# Patient Record
Sex: FEMALE | Race: WHITE | NOT HISPANIC OR LATINO | URBAN - NONMETROPOLITAN AREA
[De-identification: names, ages, dates, MRNs, and addresses within clinical notes are randomized per-mention and may not be internally consistent; named-entity substitution may affect disease eponyms.]

---

## 2020-03-04 ENCOUNTER — IMPORTED ENCOUNTER (OUTPATIENT)
Dept: URBAN - NONMETROPOLITAN AREA CLINIC 1 | Facility: CLINIC | Age: 35
End: 2020-03-04

## 2020-03-04 PROBLEM — H01.144: Noted: 2020-03-04

## 2020-03-04 PROCEDURE — 99212 OFFICE O/P EST SF 10 MIN: CPT

## 2020-03-04 NOTE — PATIENT DISCUSSION
eyelid lesion u/l os with possible cellulitisstart keflex 500 mg tid po x 1wkstart maxitrol cristi tid on lid x 1wkrtc prnHyperopia-Discussed diagnosis with patient. Updated spec Rx given. Recommend lens that will provide comfort as well as protect safety and health of eyes. CL wear-CLs fit and center well.-Stressed that patient should not sleep in CL. -Updated CL Rx given. -CL care and precautions given.

## 2020-03-11 ENCOUNTER — IMPORTED ENCOUNTER (OUTPATIENT)
Dept: URBAN - NONMETROPOLITAN AREA CLINIC 1 | Facility: CLINIC | Age: 35
End: 2020-03-11

## 2020-03-11 PROCEDURE — 99212 OFFICE O/P EST SF 10 MIN: CPT

## 2020-03-11 NOTE — PATIENT DISCUSSION
eyelid lesion u/l os with possible cellulitisCONT maxitrol cristi tid on lid UNTIL FINISHED WITH TUBE[place]AY TO CALL IN MORE MAXITROL UNGrtc prnHyperopia-Discussed diagnosis with patient. Updated spec Rx given. Recommend lens that will provide comfort as well as protect safety and health of eyes. CL wear-CLs fit and center well.-Stressed that patient should not sleep in CL. -Updated CL Rx given. -CL care and precautions given. ORDER TRIALS THEN PT TO RTC FOR REFITTING(N/C)

## 2020-05-18 ENCOUNTER — IMPORTED ENCOUNTER (OUTPATIENT)
Dept: URBAN - NONMETROPOLITAN AREA CLINIC 1 | Facility: CLINIC | Age: 35
End: 2020-05-18

## 2020-05-18 NOTE — PATIENT DISCUSSION
eyelid lesion u/l os with possible cellulitisCONT maxitrol cristi tid on lid UNTIL FINISHED WITH TUBE[place]AY TO CALL IN MORE MAXITROL UNGrtc prnHyperopia-Discussed diagnosis with patient. Updated spec Rx given. Recommend lens that will provide comfort as well as protect safety and health of eyes. CL wear-CLs fit and center well.-Stressed that patient should not sleep in CL. -Updated CL Rx given. -CL care and precautions given. ORDER TRIALS THEN PT TO RTC FOR REFITTING(N/C)Trials Proclear Toric XR disp. Copy cl rx given.

## 2022-04-09 ASSESSMENT — VISUAL ACUITY
OD_SC: 20/400 BLURRY
OS_SC: 20/25-2
OS_SC: 20/25
OD_SC: 20/400

## 2022-04-09 ASSESSMENT — TONOMETRY
OD_IOP_MMHG: 15
OS_IOP_MMHG: 15

## 2023-06-19 ENCOUNTER — NEW PATIENT (OUTPATIENT)
Dept: RURAL CLINIC 1 | Facility: CLINIC | Age: 38
End: 2023-06-19

## 2023-06-19 DIAGNOSIS — H52.03: ICD-10-CM

## 2023-06-19 PROCEDURE — 92004 COMPRE OPH EXAM NEW PT 1/>: CPT

## 2023-06-19 PROCEDURE — 92015 DETERMINE REFRACTIVE STATE: CPT

## 2023-06-19 PROCEDURE — 92310-E CONTACT LENS FITTING ESTABLISH PATIENT

## 2023-06-19 ASSESSMENT — VISUAL ACUITY
OD_CC: 20/200
OS_CC: 20/20

## 2023-06-19 ASSESSMENT — TONOMETRY
OS_IOP_MMHG: 15
OD_IOP_MMHG: 15

## 2024-06-20 ENCOUNTER — ESTABLISHED PATIENT (OUTPATIENT)
Dept: RURAL CLINIC 1 | Facility: CLINIC | Age: 39
End: 2024-06-20

## 2024-06-20 DIAGNOSIS — H53.021: ICD-10-CM

## 2024-06-20 DIAGNOSIS — H52.03: ICD-10-CM

## 2024-06-20 PROCEDURE — 92014 COMPRE OPH EXAM EST PT 1/>: CPT

## 2024-06-20 PROCEDURE — 92015 DETERMINE REFRACTIVE STATE: CPT

## 2024-06-20 PROCEDURE — 92310-E CONTACT LENS FITTING ESTABLISH PATIENT

## 2024-06-20 ASSESSMENT — TONOMETRY
OS_IOP_MMHG: 16
OD_IOP_MMHG: 16

## 2024-06-20 ASSESSMENT — VISUAL ACUITY
OD_CC: 20/400
OS_CC: 20/20